# Patient Record
Sex: MALE | Race: OTHER | HISPANIC OR LATINO | URBAN - METROPOLITAN AREA
[De-identification: names, ages, dates, MRNs, and addresses within clinical notes are randomized per-mention and may not be internally consistent; named-entity substitution may affect disease eponyms.]

---

## 2021-04-07 ENCOUNTER — OUTPATIENT (OUTPATIENT)
Dept: OUTPATIENT SERVICES | Facility: HOSPITAL | Age: 46
LOS: 1 days | Discharge: STILL ADMITTED OR ADMINISTRATIVE | End: 2021-04-07

## 2021-04-07 DIAGNOSIS — F31.9 BIPOLAR DISORDER, UNSPECIFIED: ICD-10-CM

## 2021-04-26 ENCOUNTER — EMERGENCY (EMERGENCY)
Facility: HOSPITAL | Age: 46
LOS: 1 days | Discharge: HOME | End: 2021-04-26
Admitting: EMERGENCY MEDICINE
Payer: MEDICAID

## 2021-04-26 VITALS
HEART RATE: 70 BPM | SYSTOLIC BLOOD PRESSURE: 131 MMHG | TEMPERATURE: 98 F | RESPIRATION RATE: 16 BRPM | DIASTOLIC BLOOD PRESSURE: 80 MMHG | OXYGEN SATURATION: 98 %

## 2021-04-26 DIAGNOSIS — F31.9 BIPOLAR DISORDER, UNSPECIFIED: ICD-10-CM

## 2021-04-26 PROCEDURE — 99284 EMERGENCY DEPT VISIT MOD MDM: CPT

## 2021-04-26 PROCEDURE — 90792 PSYCH DIAG EVAL W/MED SRVCS: CPT

## 2021-04-26 NOTE — ED BEHAVIORAL HEALTH ASSESSMENT NOTE - HPI (INCLUDE ILLNESS QUALITY, SEVERITY, DURATION, TIMING, CONTEXT, MODIFYING FACTORS, ASSOCIATED SIGNS AND SYMPTOMS)
Size Of Lesion In Cm (Optional): 0 Detail Level: Detailed Pt is a 45[facility] man [facility], employed as , domiciled alone with dog; PPH bipolar disorder diagnosed 2 months ago, 1 past hospitalization at [facility] 2 months ago, currently in outpatient treatment at [facility], no past SA/NSSIB; no substance/violence/legal history; PMH diabetes; presenting to ED referred by therapist for SI.    Pt reports he was speaking with his therapist today and told her that he has thoughts that he would be better off dead. Pt told therapist he sometimes wonders if he should call 911, but believes this comment made his therapist believe he was acutely suicidal. However pt states he wanted to come to ED to see if he could get his medications changed and denies suicidal intent/plan. Of note, [facility] refills although he had a psychiatric provider at the time.     Pt reports that his main issue is anxiety and restlessness. Denies panic attacks but states he has had a terrible headache x4 days which he attributes to anxiety. Pt attempts to cope with distraction (ie watching TV) which is somewhat helpful. Hydroxyzine has not been very effective.  Reports adherence with lithium and abilify. Believes that abilify has somewhat worsened anxiety/restlessness, but that these predated medication. Anxiety has affected sleep (now sleeping 6 hours instead of 8-10). Pt has also had 20lb weight loss over past 2 months but reports this was partly intentional with diet/exercise changes.    Pt states he feels overwhelmed with stress at work and is completely alone so he wonders if his life is worth living and sometimes hopes he would die "of natural causes." However, pt states he would never commit suicide because it is against his Restoration. He denies any intent/plan, denies preparatory behaviors. When asked, pt states he brought his dog to the shelter today but states he did so because he thought he would be admitted and not because he intended to kill himself. States he wants to  dog tonight.     Pt denies AVH. Pt reports that his mother wants to kill him and that he has called [employer] to make a report. He states that his family are Illuminati and for his whole life have told him that he is a descendent of Maria Eugenialer (pt pointed to palm lines) and states that because of this people are after him. When asked additional details, pt is vague and repeatedly states "it's a long story." When asked if he believes he is a descendent of Boris, he states he does not. No evidence of ana. No substance use. Pt is a 47yo man from [facility], moved [place] ago, employed as , domiciled alone with dog; PPH bipolar disorder diagnosed 2 months ago, 1 past hospitalization at [facility] 2 months ago, currently in outpatient treatment at [facility], one remote SA by OD; no substance/violence/legal history; PMH diabetes; presenting to ED referred by therapist for SI.    Assessment completed with help of . Pt reports he was speaking with his therapist today and told her that he has thoughts that he would be better off dead. Pt told therapist he sometimes wonders if he should call 911, but believes this comment made his therapist believe he was acutely suicidal. However pt states he wanted to come to ED to see if he could get his medications changed and denies suicidal intent/plan. Of note, [facility] refills although he had a psychiatric provider at the time.     Pt reports that his main issue is anxiety and restlessness. Denies panic attacks but states he has had a terrible headache x4 days which he attributes to anxiety. Pt attempts to cope with distraction (ie watching TV) which is somewhat helpful. Hydroxyzine has not been very effective.  Reports adherence with lithium and abilify. Believes that abilify has somewhat worsened anxiety/restlessness, but that these predated medication. Anxiety has affected sleep (now sleeping 6 hours instead of 8-10). Pt has also had 20lb weight loss over past 2 months but reports this was partly intentional with diet/exercise changes.    Pt states he feels overwhelmed with stress at work and is completely alone so he wonders if his life is worth living and sometimes hopes he would die "of natural causes." However, pt states he would never commit suicide because it is against his Oriental orthodox. He denies any intent/plan, denies preparatory behaviors. Pt states he brought his dog to the shelter today but states he did so because he thought he would be admitted and not because he intended to kill himself. States he wants to  dog tonight.     Pt denies AVH. Reports possible paranoid delusion - states that his mother wants to kill him and that he has called [employer] to make a report. He states that his family are Illuminati and for his whole life have told him that he is a descendent of Hitler (pt pointed to palm lines) and states that because of this people are after him. When asked additional details, pt is vague and repeatedly states "it's a long story." When asked if he believes he is a descendent of Boris, he states he does not. No evidence of ana. No substance use.    See  ED Note for collateral from therapist Pt is a 45yo man from [facility], [place], employed as , domiciled alone with dog; PPH bipolar disorder diagnosed 2 months ago, 1 past hospitalization at [facility] 2 months ago, currently in outpatient treatment at [facility], one remote SA by OD; no substance/violence/legal history; PMH diabetes; presenting to ED referred by therapist for SI.    Assessment completed with assistance of . Pt reports he was speaking with his therapist today and told her that he has thoughts that he would be better off dead. Pt told therapist he sometimes wonders if he should call 911, but believes this comment made his therapist believe he was acutely suicidal. However pt states he wanted to come to ED to see if he could get his medications changed and denies suicidal intent/plan. Of note, [facility] refills although he had a psychiatric provider at the time.     Pt reports that his main issue is anxiety and restlessness. Denies panic attacks but states he has had a terrible headache x4 days which he attributes to anxiety. Pt attempts to cope with distraction (ie watching TV) which is somewhat helpful. Hydroxyzine has not been very effective.  Reports adherence with lithium and abilify. Believes that abilify has somewhat worsened anxiety/restlessness, but that these predated medication. Anxiety has affected sleep (now sleeping 6 hours instead of 8-10). Pt has also had 20lb weight loss over past 2 months but reports this was partly intentional with diet/exercise changes.    Pt states he feels overwhelmed with stress at work and is completely alone so he wonders if his life is worth living and sometimes hopes he would die "of natural causes" because his "life is destroyed." However, pt states he would never commit suicide because it is against his Alevism. He denies any intent/plan, denies preparatory behaviors. Pt states he brought his dog to the shelter today but states he did so because he thought he would be admitted and not because he intended to kill himself. States he wants to  dog tonight.     Pt denies AVH. Reports possible paranoid delusion - states that his mother wants to kill him and that he has called [employer] to make a report. He states that his family are Illuminati and for his whole life have told him that he is a descendent of Boris (pt pointed to palm lines) and states that because of this people are after him. When asked additional details, pt is vague and repeatedly states "it's a long story." When asked if he believes he is a descendent of Boris, he states he does not. Pt adamantly denies any feelings of aggression or HI towards family stating "I am a very peaceful person." No evidence of ana. No substance use.    See  ED Note for collateral from therapist Pt is a 45yo man from [facility], moved [place] ago, employed as , domiciled alone with dog; PPH bipolar disorder diagnosed 2 months ago, 1 past hospitalization at [facility] 2 months ago, currently in outpatient treatment at [facility], one remote SA by OD; no substance/violence/legal history; PMH diabetes; presenting to ED referred by therapist for SI.    Assessment completed with assistance of . Pt reports he was speaking with his therapist today and told her that he has thoughts that he would be better off dead. Pt told therapist he sometimes wonders if he should call 911, but believes this comment made his therapist believe he was acutely suicidal. However pt states he wanted to come to ED to see if he could get his medications changed and denies suicidal intent/plan. Of note, [facility] refills although he had a psychiatric provider at the time.     Pt reports that his main issue is anxiety and restlessness. Denies panic attacks but states he has had a terrible headache x4 days which he attributes to anxiety. Pt attempts to cope with distraction (ie watching TV) which is somewhat helpful. Hydroxyzine has not been very effective.  Reports adherence with lithium and abilify. Believes that abilify has somewhat worsened anxiety/restlessness, but that these predated medication. Anxiety has affected sleep (now sleeping 6 hours instead of 8-10). Pt has also had 20lb weight loss over past 2 months but reports this was partly intentional with diet/exercise changes.    Pt states he feels overwhelmed with stress at work and is completely alone so he wonders if his life is worth living and sometimes hopes he would die "of natural causes" because his "life is destroyed." However, pt states he would never commit suicide because it is against his Hinduism. He denies any intent/plan, denies preparatory behaviors. Pt states he brought his dog to the shelter today but states he did so because he thought he would be admitted and not because he intended to kill himself. States he wants to  dog tonight.     Pt denies AVH. Reports possible paranoid delusion - states that his mother wants to kill him and that he has called [employer] to make a report. He states that his family are Illuminati and for his whole life have told him that he is a descendent of Boris (pt pointed to palm lines) and states that because of this people are after him. When asked additional details, pt is vague and repeatedly states "it's a long story." When asked if he believes he is a descendent of Boris, he states he does not. Pt adamantly denies any feelings of aggression or HI towards family stating "I am a very peaceful person." No evidence of ana. No substance use.    See  ED Note for collateral from therapist.  [facility] Reference #: [id number] yielded no results. No reports found in [facility]ES.

## 2021-04-26 NOTE — ED PROVIDER NOTE - CLINICAL SUMMARY MEDICAL DECISION MAKING FREE TEXT BOX
Psych consult recommendation out patient follow up. There is no clinical evidence of intoxication, or any acute medical problem requiring immediate intervention. This is a 46 yr old m, pmh depression and anxiety with increased anxiety, panic attack and passive si. 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  [place] ID# [id number], he states he does not feel well and worsening for the last 5 days, thinking about to "better to be dead" . He even thinks of si but " I am coward, I would not do it" He states he feels isolate, alone no friends. He states couple of weeks ago he had a very complex tattoo to work on and since then he is over thinking. He reports compliance with medications, ( lithium, abilify, atarax)  therapist Floridalma Dias ( [phone])  Psych consult recommendation out patient follow up. There is no clinical evidence of intoxication, or any acute medical problem requiring immediate intervention.

## 2021-04-26 NOTE — ED BEHAVIORAL HEALTH ASSESSMENT NOTE - SAFETY PLAN ADDT'L DETAILS
Safety plan discussed with.../Education provided regarding environmental safety / lethal means restriction/Provision of National Suicide Prevention Lifeline 5-928-712-SUJF (6330)

## 2021-04-26 NOTE — ED BEHAVIORAL HEALTH ASSESSMENT NOTE - DETAILS
passive SI headache deferred [facility] Malini Dias [phone]

## 2021-04-26 NOTE — ED BEHAVIORAL HEALTH ASSESSMENT NOTE - OTHER PAST PSYCHIATRIC HISTORY (INCLUDE DETAILS REGARDING ONSET, COURSE OF ILLNESS, INPATIENT/OUTPATIENT TREATMENT)
Diagnosed with bipolar disorder 2 months ago when he was hospitalized at [facility]. Patient states he does not recall reason for hospitalization but thinks it was due to "anxiety." Pt currently in treatment with [facility] - does not recall name of psychiatrist but therapist is Malini Dias.   No history of suicide attempts or NSSIB. Pt with bipolar disorder, one hospitalization at [facility] 2 months ago for ana with psychosis. Pt currently in treatment with [facility] - does not recall name of psychiatrist but therapist is Malini Dias.   Pt had one remote SA in his 20s by overdose on pills.

## 2021-04-26 NOTE — ED BEHAVIORAL HEALTH ASSESSMENT NOTE - MEDICATIONS (PRESCRIPTIONS, DIRECTIONS)
Continue home medications Ativan 0.5mg 1-4x/day PRN anxiety; continue home medications of lithium, abilify and atarax.

## 2021-04-26 NOTE — ED BEHAVIORAL HEALTH NOTE - BEHAVIORAL HEALTH NOTE
Collateral obtained from therapist Malini Dias at [facility] ([phone]).   Pt's current diagnosis is Bipolar 1 and has been depressed most of his life. Pt has one remote SA in his 20s by overdose. No other SAs known. He was hospitalized 2 months ago at [facility] for ana with psychosis after he was found at a government building and  were called. Pt currently has ongoing possible delusion that family wants to kill him.  Pt called therapist today stating he feels he is decompensating and that his depression and anxiety are worsening to the point that he feels he would be better off dead. Pt denied any plan and feels that he would never have the nerve to act on these thoughts, but has been wondering if there is a peaceful way to go.   Therapist states she was reassured that he called her to disclose this passive SI, but that she was somewhat concerned based off of his past impulsivity and social isolation.  She is in agreement that he is not an imminent risk, however, and plans to see him 2x/week going forward. She will call him tomorrow morning to f/u. Collateral obtained from therapist Malini Dias at [facility] ([phone]).     Pt's current diagnosis is Bipolar 1 and has been depressed most of his life. Pt has one remote SA in his 20s by overdose. No other SAs known. He was hospitalized 2 months ago at [facility] for ana with psychosis after he was found at a government building and  were called. Pt currently has ongoing possible delusion that family wants to kill him.  Pt called therapist today stating he feels he is decompensating and that his depression and anxiety are worsening to the point that he feels he would be better off dead. Pt denied any plan and feels that he would never have the nerve to act on these thoughts, but has been wondering if there is a peaceful way to go.   Therapist states she was reassured that he called her to disclose this passive SI, but that she was somewhat concerned based off of his past impulsivity and social isolation.  She is in agreement that he is not an imminent risk, however, and plans to see him 2x/week going forward. She will call him tomorrow morning to f/u.

## 2021-04-26 NOTE — ED BEHAVIORAL HEALTH ASSESSMENT NOTE - DESCRIPTION
calm, cooperative    Vital Signs Last 24 Hrs  T(C): 36.8 (26 Apr 2021 17:59), Max: 36.8 (26 Apr 2021 17:59)  T(F): 98.2 (26 Apr 2021 17:59), Max: 98.2 (26 Apr 2021 17:59)  HR: 70 (26 Apr 2021 17:59) (70 - 70)  BP: 131/80 (26 Apr 2021 17:59) (131/80 - 131/80)  BP(mean): --  RR: 16 (26 Apr 2021 17:59) (16 - 16)  SpO2: 98% (26 Apr 2021 17:59) (98% - 98%) Diabetes Pt born in [facility], came [place] ago, currently employed as , domiciled alone. Few social supports but identifies 2 friends and has a dog.

## 2021-04-26 NOTE — ED BEHAVIORAL HEALTH ASSESSMENT NOTE - DIFFERENTIAL
Bipolar disorder  Mood disorder due to organic medical condition Bipolar disorder with psychosis  r/o mood disorder due to organic medical condition

## 2021-04-26 NOTE — ED BEHAVIORAL HEALTH ASSESSMENT NOTE - CASE SUMMARY
46/M with hx of Bipolar disorder, one previous hospitalization x 2 months ago at [facility], has one prior remote SA via OD, chronic passive SI (no intent or plans verbalized), and no illicit substance abuse.  Pt is currently in outpatient treatment at [facility].  Today, presented to the ED as a referral from his therapist for evaluation of SI.    ON examination, the Pt endorsed symptoms of depression, anxiety and hopelessness in setting of poor social support including referential ideations involving family as well as dissatisfaction from current employment.  Pt does admits previously harboring passive SI but adamantly denies any intent or plans.  Pt further added that recently obtained Lithium level has been subtherapeutic; with potential akathasia experienced resultant of use of Abilify.  Said symptom may help potential ongoing anxiety symptoms.  Pt has agreed to come to the hospital (in conjunction with therapist's advice) today because he thought his medication might be changed.  Collateral information was obtained from his therapist who reported that indeed, she was aware of Pt's endorsed passive SI today but no intent/plan.   Therapist had been  concerned due to Pt having access to medication and with his past hx of impulsive behavior. She was however, reassured as Pt had reached out to her divulging his worsening symptoms.        Since his  ED arrival, the Pt has been calm and cooperative.  There has been no agitation/aggressive behavior.  No current verbalization of active/ passive SI/HI.   There are no signs/symptoms of acute ana or florid psychosis.  Pt is not showing any signs/symptoms of intoxication or withdrawal.  He is not delirious.  Pt has not tested limits.. Has maintained appropriate boundaries. Pt has been easily redirected.  Overall, there has been no management issues.    Pt was able to partake towards safety planning.  He was offered voluntary hospitalization, but he refused.  Pt is is help-seeking and future-oriented.  He is currently connected to outpatient care and has follow up appt tomorrow.  at this time, he does not represent an imminent risk to self and therefore, cannot be admitted on an involuntary basis.  Therapist is in agreement with service's proposed recommendations and therapist, plans to see him 2x/week for psychotherapy instead of weekly sessions.

## 2021-04-26 NOTE — ED ADULT TRIAGE NOTE - CHIEF COMPLAINT QUOTE
Pt states he has been feeling suicidal, denies HI, denies having a plan. Denies ETOH or drug use. Denies AH, TH, VH. PMH: Bipolar.

## 2021-04-26 NOTE — ED BEHAVIORAL HEALTH ASSESSMENT NOTE - MODIFICATIONS
I have seen and examined the Pt with Dr TATYANA Snell and performed key elements of the History and Mental Status Examination.  I concur with her assessment and recommendations.   I have discussed the Pt's assessment and plan of care with Dr TATYANA Snell.   I agree with the note as stated above, having amended the EMR as needed to reflect my findings.  This includes during the time I functioned as the attending physician for this Pt at the [facility] of [facility].

## 2021-04-26 NOTE — ED BEHAVIORAL HEALTH ASSESSMENT NOTE - VIOLENCE PROTECTIVE FACTORS:
Residential stability/Employment stability/Sobriety/Engagement in treatment/Insight into violence risk and need for management/treatment

## 2021-04-26 NOTE — ED ADULT NURSE REASSESSMENT NOTE - NS ED NURSE REASSESS COMMENT FT1
Pt ate and hydrated and was evaluated by psych.  Pt cleared for discharge back home.  Pt provided with discharge instructions for follow-up along with prescription for lorazepam at his local pharmacy.  Pt stated he would call a cab.  Pt was in no acute distress.

## 2021-04-26 NOTE — ED PROVIDER NOTE - NSFOLLOWUPINSTRUCTIONS_ED_ALL_ED_FT
Anxiety    Generalized anxiety disorder (DORINDA) is a mental disorder. It is defined as anxiety that is not necessarily related to specific events or activities or is out of proportion to said events. Symptoms include restlessness, fatigue, difficulty concentrations, irritability and difficulty concentrating. It may interfere with life functions, including relationships, work, and school. If you were started on a medication, make sure to take exactly as prescribed and follow up with a psychiatrist.    SEEK IMMEDIATE MEDICAL CARE IF YOU HAVE ANY OF THE FOLLOWING SYMPTOMS: thoughts about hurting killing yourself, thoughts about hurting or killing somebody else, hallucinations, or worsening depression.

## 2021-04-26 NOTE — ED PROVIDER NOTE - PATIENT PORTAL LINK FT
You can access the FollowMyHealth Patient Portal offered by [facility] by registering at the following website: [email or web address]. By joining [employer]’s FollowMyHealth portal, you will also be able to view your health information using other applications (apps) compatible with our system.

## 2021-04-26 NOTE — ED BEHAVIORAL HEALTH ASSESSMENT NOTE - REFERRAL / APPOINTMENT DETAILS
Pt has f/u with psychiatrist at [facility] on 5/16/21 Pt has f/u with therapist at [facility] on 4/27/21

## 2021-04-26 NOTE — ED BEHAVIORAL HEALTH ASSESSMENT NOTE - RISK ASSESSMENT
Low Acute Suicide Risk Pt is currently a moderate risk, but currently his protective factors outweigh risk factors, making him appropriate for ongoing outpatient care.   Acute risk factors include passive SI, mood episode, paranoia, anxiety, lack of social support, job dissatisfaction. Chronic risk factors include history of remote SA, past hospitalization, age, gender, immigrant. Protective factors include no active SI, Latter-day beliefs, dependent pet, good therapeutic relationship, no access to weapons, no substance use. Moderate Acute Suicide Risk

## 2021-04-26 NOTE — ED PROVIDER NOTE - OBJECTIVE STATEMENT
[place] ID# [id number]  therapist Floridalma Dias ( [phone]) This is a 46 yr old m, [facility] depression and anxiety with increased anxiety, panic attack and passive si. 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  [place] ID# [id number], he states he does not feel well and worsening for the last 5 days, thinking about to "better to be dead" . He even thinks of si but " I am coward, I would not do it" He states he feels isolate, alone no friends. He states couple of weeks ago he had a very complex tattoo to work on and since then he is over thinking. He reports compliance with medications, ( lithium, abilify, atarax)  therapist Floridalma Dias ( [phone])

## 2021-04-26 NOTE — ED BEHAVIORAL HEALTH NOTE - BEHAVIORAL HEALTH NOTE
contacted outpatient therapist/referral source, Olivia Dias, at [facility] #[phone].  received VM as office currently closed with alternate number of [phone] listed.  then reached , Meena Scott, who reported that she was attempting to outreach Ms. Dias to notify her of requested call.

## 2021-04-26 NOTE — ED BEHAVIORAL HEALTH ASSESSMENT NOTE - SUMMARY
Pt is a 45yo man with PPH bipolar disorder, one previous hospitalization 2 months ago at [facility], currently in outpatient treatment at [facility], one remote suicide attempt by OD, who presented to ED referred by therapist for SI.    Per therapist, pt endorsed passive SI today with no intent/plan. She was concerned due to access to medication and pt's history of impulsive behavior. She was reassured, however, that pt reached out to her to tell her his symptoms were worsening and she plans to see him 2x/week instead of 1x/week going forward. Pt confirms that he has passive SI but adamantly denies active SI/I/P citing Christian as protective factor. Pt has experienced worsening depression, anxiety and hopelessness in setting of lack of social support, job dissatisfaction and paranoid delusions involving family. Pt reports lithium levels are subtherapeutic, abilify may be worsening restlessness and hydroxyzine has been ineffective. States he agreed to come to the hospital today because he thought his medication might be changed. He is not acutely intoxicated, manic, psychotic or delirious. He was offered voluntary hospitalization, which he declined. Pt is denying active SI/HI, he is help-seeking and future-oriented, currently connected to outpatient care with f/u tomorrow, and does not represent an imminent risk to self. He therefore does not meet criteria for involuntary hospitalization and will be discharged with outpatient follow-up. Therapist is in agreement with plan. Pt is a 45yo man with PPH bipolar disorder, one previous hospitalization 2 months ago at [facility], currently in outpatient treatment at [facility], one remote suicide attempt by OD, who presented to ED referred by therapist for SI.    Per therapist, pt endorsed passive SI today with no intent/plan. She was concerned due to access to medication and pt's history of impulsive behavior. She was reassured, however, that pt reached out to her to tell her his symptoms were worsening. Pt has experienced worsening depression, anxiety and hopelessness in setting of lack of social support, job dissatisfaction and paranoid delusions involving family. Pt admits to passive SI but adamantly denies active SI/I/P citing Sabianism as protective factor.  Pt reports lithium levels are subtherapeutic, abilify may be worsening restlessness and hydroxyzine has been ineffective. States he agreed to come to the hospital today because he thought his medication might be changed. He was offered voluntary hospitalization, which he declined. Pt is denying active SI/HI, he is help-seeking and future-oriented, currently connected to outpatient care with f/u tomorrow, and does not represent an imminent risk to self.  He is not acutely intoxicated, manic, psychotic or delirious. He therefore does not meet criteria for involuntary hospitalization and will be discharged with outpatient follow-up. Therapist is in agreement with plan and she plans to see him 2x/week instead of 1x/week going forward.

## 2021-04-26 NOTE — ED ADULT NURSE NOTE - OBJECTIVE STATEMENT
pt a&o x3 c/o "bad thoughts" states "im thinking it is better to be dead". also reports anxiety. denies s/i, h/i, a/h, v/h, etoh or drug use. states he takes medication for depression and anxiety, cannot state the meds currently but reports compliance. nad noted

## 2021-04-28 PROBLEM — F41.9 ANXIETY DISORDER, UNSPECIFIED: Chronic | Status: ACTIVE | Noted: 2021-04-26

## 2021-04-28 PROBLEM — F39 UNSPECIFIED MOOD [AFFECTIVE] DISORDER: Chronic | Status: ACTIVE | Noted: 2021-04-26

## 2021-06-01 PROBLEM — Z00.00 ENCOUNTER FOR PREVENTIVE HEALTH EXAMINATION: Status: ACTIVE | Noted: 2021-06-01

## 2021-06-04 ENCOUNTER — APPOINTMENT (OUTPATIENT)
Dept: NEUROLOGY | Facility: CLINIC | Age: 46
End: 2021-06-04

## 2023-02-07 NOTE — ED BEHAVIORAL HEALTH ASSESSMENT NOTE - REMOTE MEMORY
Pharmacy calling  for prior authorization on:    Medication: acetaminophen-codeine (TYLENOL NO.3) 300-30 MG per tablet  Dosage: 1-2 tablet, Oral, EVERY 6 HOURS PRN  Quantity requested: 30 tablet   Pharmacy for prescription has been selected.    Initiation of prior authorization needed.    
Normal

## 2023-12-29 NOTE — ED BEHAVIORAL HEALTH NOTE - BEHAVIORAL HEALTH NOTE
High Risk Log:   Worker called pt [phone] and he denies SI. He states he has a f/u apt with his therapist on Friday at 9:30am. Admission